# Patient Record
Sex: FEMALE | Race: WHITE | NOT HISPANIC OR LATINO | Employment: UNEMPLOYED | URBAN - METROPOLITAN AREA
[De-identification: names, ages, dates, MRNs, and addresses within clinical notes are randomized per-mention and may not be internally consistent; named-entity substitution may affect disease eponyms.]

---

## 2019-01-31 ENCOUNTER — HOSPITAL ENCOUNTER (OUTPATIENT)
Dept: URGENT CARE | Facility: CLINIC | Age: 29
Discharge: HOME | End: 2019-01-31

## 2019-02-02 LAB — BACTERIA SPEC AEROBE CULT: NORMAL

## 2025-05-28 ENCOUNTER — OFFICE VISIT (OUTPATIENT)
Dept: CARDIOLOGY | Facility: CLINIC | Age: 35
End: 2025-05-28
Payer: COMMERCIAL

## 2025-05-28 VITALS
WEIGHT: 229 LBS | BODY MASS INDEX: 46.16 KG/M2 | HEART RATE: 77 BPM | HEIGHT: 59 IN | DIASTOLIC BLOOD PRESSURE: 80 MMHG | SYSTOLIC BLOOD PRESSURE: 111 MMHG

## 2025-05-28 DIAGNOSIS — R01.1 HEART MURMUR: Primary | ICD-10-CM

## 2025-05-28 RX ORDER — VILOXAZINE HYDROCHLORIDE 100 MG/1
3 CAPSULE, EXTENDED RELEASE ORAL DAILY
COMMUNITY

## 2025-05-28 RX ORDER — CLONIDINE HYDROCHLORIDE 0.1 MG/1
0.1 TABLET ORAL 2 TIMES DAILY
COMMUNITY

## 2025-05-28 NOTE — PROGRESS NOTES
"  CARDIOLOGY INITIAL CONSULT       Chief Complaint  Establish Care (Family of heart Murmur) and Heart Murmur    Subjective            Harish Lennon presents to [facility] CARDIOLOGY  Heart Murmur    The patient is referred for a cardiovascular evaluation due to a heart murmur.  She reports that he is aware of the murmur since childhood.  The patient denies dyspnea, palpitations or syncope.She quit smoking few years ago.  She denies family history of premature coronary artery disease.  Her EKG is normal.      Past History:    Medical History:  Past Medical History:   Diagnosis Date    ADHD     Bipolar 1 disorder     Heart murmur     PTSD (post-traumatic stress disorder)        Surgical History: has a past surgical history that includes  section (2018).     Family History: family history is not on file.     Social History: reports that she has quit smoking. Her smoking use included cigarettes. She started smoking about 16 years ago. She has a 3 pack-year smoking history. She has never been exposed to tobacco smoke. She has never used smokeless tobacco. She reports that she does not drink alcohol and does not use drugs.    Allergies: Egg-derived products and Pertussis vaccine    Current Outpatient Medications on File Prior to Visit   Medication Sig    Cariprazine HCl (Vraylar) 1.5 MG capsule capsule Take 1 capsule by mouth Daily.    cloNIDine (CATAPRES) 0.1 MG tablet Take 1 tablet by mouth 2 (Two) Times a Day.    FLUoxetine (PROzac) 20 MG capsule Take 1 capsule by mouth Daily.    Viloxazine HCl ER (Qelbree) 100 MG capsule sustained-release 24 hr Take 3 capsules by mouth Daily.     No current facility-administered medications on file prior to visit.          Review of Systems : All systems were reviewed and negative    Objective     /80 (BP Location: Left arm)   Pulse 77   Ht 149.9 cm (59\")   Wt 104 kg (229 lb)   BMI 46.25 kg/m²       Physical Exam  Alert  Heart: regular, no " gallops, SM +, soft 4th left ICS  Lungs; No rales, no wheezing  LE: no edema  Neurologic no motor deficits.     Result Review :     The following data was reviewed by: Tramaine Mccormick MD on 05/28/2025:               Data reviewed: Cardiology studies              ECG 12 Lead    Date/Time: 5/28/2025 10:52 AM  Performed by: Tramaine Funk MD    Authorized by: Tramaine Funk MD  Comparison: not compared with previous ECG   Rhythm: sinus rhythm              Assessment and Plan        Diagnoses and all orders for this visit:    1. Heart murmur (Primary)  -     Adult Transthoracic Echo Complete W/ Cont if Necessary Per Protocol; Future        The patient is cardiac wise asymptomatic.  Had a history of heart murmur since childhood.  By auscultation the murmur appears benign but will proceed with a 2D echo to assess cardiac function and any structural abnormalities.  Continue with lifestyle modification and healthy diet.  Continue regular exercise and advised to lose weight for goal BMI less than 28.    I spent 45 minutes caring for Harish on this date of service. This time includes time spent by me in the following activities:reviewing tests, obtaining and/or reviewing a separately obtained history, performing a medically appropriate examination and/or evaluation , ordering medications, tests, or procedures, and documenting information in the medical record.  Harish Lennon  reports that she has quit smoking. Her smoking use included cigarettes. She started smoking about 16 years ago. She has a 3 pack-year smoking history. She has never been exposed to tobacco smoke. She has never used smokeless tobacco. I have educated her on the risk of diseases from using tobacco products such as cancer, COPD and heart disease.     I advised her to quit and she is no longer a tobacco user and has quit.    I spend 5 minutes on counseling the patient.    Follow Up     Return for [facility], After testing.    Patient was given instructions  and counseling regarding her condition or for health maintenance advice. Please see specific information pulled into the AVS if appropriate.

## 2025-05-30 ENCOUNTER — TELEPHONE (OUTPATIENT)
Dept: CARDIOLOGY | Facility: CLINIC | Age: 35
End: 2025-05-30
Payer: COMMERCIAL

## 2025-06-05 ENCOUNTER — HOSPITAL ENCOUNTER (OUTPATIENT)
Facility: HOSPITAL | Age: 35
Discharge: HOME | End: 2025-06-05
Admitting: INTERNAL MEDICINE
Payer: COMMERCIAL

## 2025-06-05 DIAGNOSIS — R01.1 HEART MURMUR: ICD-10-CM

## 2025-06-05 LAB
AORTIC DIMENSIONLESS INDEX: 0.81 (DI)
ASCENDING AORTA: 2.7 CM
AV MEAN PRESS GRAD SYS DOP V1V2: 4 MMHG
BH CV ECHO MEAS - AO ROOT DIAM: 2.2 CM
BH CV ECHO MEAS - AO V2 VTI: 27.1 CM
BH CV ECHO MEAS - AVA(I,D): 2.3 CM2
BH CV ECHO MEAS - EDV(CUBED): 108.5 ML
BH CV ECHO MEAS - EDV(MOD-SP2): 98.3 ML
BH CV ECHO MEAS - EDV(MOD-SP4): 78.3 ML
BH CV ECHO MEAS - EF(MOD-SP2): 60.2 %
BH CV ECHO MEAS - EF(MOD-SP4): 59.8 %
BH CV ECHO MEAS - ESV(CUBED): 31 ML
BH CV ECHO MEAS - ESV(MOD-SP2): 39.1 ML
BH CV ECHO MEAS - ESV(MOD-SP4): 31.5 ML
BH CV ECHO MEAS - FS: 34.2 %
BH CV ECHO MEAS - IVS/LVPW: 1.03 CM
BH CV ECHO MEAS - IVSD: 1.1 CM
BH CV ECHO MEAS - LA DIMENSION: 3.3 CM
BH CV ECHO MEAS - LAT PEAK E' VEL: 13.4 CM/SEC
BH CV ECHO MEAS - LV DIASTOLIC VOL/BSA (35-75): 40 CM2
BH CV ECHO MEAS - LV MASS(C)D: 188.4 GRAMS
BH CV ECHO MEAS - LV MAX PG: 4.9 MMHG
BH CV ECHO MEAS - LV MEAN PG: 3 MMHG
BH CV ECHO MEAS - LV SYSTOLIC VOL/BSA (12-30): 16.1 CM2
BH CV ECHO MEAS - LV V1 MAX: 111 CM/SEC
BH CV ECHO MEAS - LV V1 VTI: 22 CM
BH CV ECHO MEAS - LVIDD: 4.8 CM
BH CV ECHO MEAS - LVIDS: 3.1 CM
BH CV ECHO MEAS - LVOT AREA: 2.8 CM2
BH CV ECHO MEAS - LVOT DIAM: 1.9 CM
BH CV ECHO MEAS - LVPWD: 1.07 CM
BH CV ECHO MEAS - MED PEAK E' VEL: 13.2 CM/SEC
BH CV ECHO MEAS - MR MAX PG: 14 MMHG
BH CV ECHO MEAS - MR MAX VEL: 184.7 CM/SEC
BH CV ECHO MEAS - MV A MAX VEL: 60 CM/SEC
BH CV ECHO MEAS - MV DEC SLOPE: 462.5 CM/SEC2
BH CV ECHO MEAS - MV DEC TIME: 0.17 SEC
BH CV ECHO MEAS - MV E MAX VEL: 80.1 CM/SEC
BH CV ECHO MEAS - MV E/A: 1.34
BH CV ECHO MEAS - MV MEAN PG: 2 MMHG
BH CV ECHO MEAS - MV P1/2T: 78.5 MSEC
BH CV ECHO MEAS - MV V2 VTI: 30.6 CM
BH CV ECHO MEAS - MVA(P1/2T): 2.8 CM2
BH CV ECHO MEAS - MVA(VTI): 2.04 CM2
BH CV ECHO MEAS - RAP SYSTOLE: 3 MMHG
BH CV ECHO MEAS - RVDD: 2.41 CM
BH CV ECHO MEAS - RVSP: 13 MMHG
BH CV ECHO MEAS - SV(LVOT): 62.4 ML
BH CV ECHO MEAS - SV(MOD-SP2): 59.2 ML
BH CV ECHO MEAS - SV(MOD-SP4): 46.8 ML
BH CV ECHO MEAS - SVI(LVOT): 31.9 ML/M2
BH CV ECHO MEAS - SVI(MOD-SP2): 30.3 ML/M2
BH CV ECHO MEAS - SVI(MOD-SP4): 23.9 ML/M2
BH CV ECHO MEAS - TR MAX PG: 9.6 MMHG
BH CV ECHO MEAS - TR MAX VEL: 155 CM/SEC
BH CV ECHO MEASUREMENTS AVERAGE E/E' RATIO: 6.02
BH CV XLRA - TDI S': 13.2 CM/SEC
LV EF BIPLANE MOD: 60 %

## 2025-06-05 PROCEDURE — 93306 TTE W/DOPPLER COMPLETE: CPT

## 2025-06-06 NOTE — TELEPHONE ENCOUNTER
Interpretation Summary         Left ventricular ejection fraction appears to be 61 - 65%.    Left ventricular wall thickness is consistent with borderline concentric hypertrophy.    Left ventricular diastolic function was normal.    Estimated right ventricular systolic pressure from tricuspid regurgitation is normal (<35 mmHg).

## 2025-06-06 NOTE — TELEPHONE ENCOUNTER
Please let her know echocardiogram shows essentially normal cardiac function, and yes it is reasonable for her to trial stimulant medication.  Would recommend she monitor for changes in her blood pressure after starting stimulant medication.  Unless she has new or concerning problems, would just recommend yearly follow-up with Dr. Mccormick.